# Patient Record
Sex: FEMALE | Race: WHITE | NOT HISPANIC OR LATINO | Employment: OTHER | URBAN - METROPOLITAN AREA
[De-identification: names, ages, dates, MRNs, and addresses within clinical notes are randomized per-mention and may not be internally consistent; named-entity substitution may affect disease eponyms.]

---

## 2021-09-08 ENCOUNTER — THERAPY VISIT (OUTPATIENT)
Dept: PHYSICAL THERAPY | Facility: CLINIC | Age: 66
End: 2021-09-08
Payer: COMMERCIAL

## 2021-09-08 DIAGNOSIS — M25.552 HIP PAIN, LEFT: ICD-10-CM

## 2021-09-08 PROCEDURE — 97110 THERAPEUTIC EXERCISES: CPT | Mod: GP | Performed by: PHYSICAL THERAPIST

## 2021-09-08 PROCEDURE — 97161 PT EVAL LOW COMPLEX 20 MIN: CPT | Mod: GP | Performed by: PHYSICAL THERAPIST

## 2021-09-08 ASSESSMENT — ACTIVITIES OF DAILY LIVING (ADL)
GOING_UP_1_FLIGHT_OF_STAIRS: SLIGHT DIFFICULTY
LIGHT_TO_MODERATE_WORK: NO DIFFICULTY AT ALL
WALK: ACTIVITY IS MINIMALLY DIFFICULT
GO DOWN STAIRS: ACTIVITY IS MINIMALLY DIFFICULT
GETTING_INTO_AND_OUT_OF_A_BATHTUB: UNABLE TO DO
KNEE_ACTIVITY_OF_DAILY_LIVING_SUM: 52
WALKING_UP_STEEP_HILLS: SLIGHT DIFFICULTY
WALKING_APPROXIMATELY_10_MINUTES: SLIGHT DIFFICULTY
HOW_WOULD_YOU_RATE_THE_CURRENT_FUNCTION_OF_YOUR_KNEE_DURING_YOUR_USUAL_DAILY_ACTIVITIES_ON_A_SCALE_FROM_0_TO_100_WITH_100_BEING_YOUR_LEVEL_OF_KNEE_FUNCTION_PRIOR_TO_YOUR_INJURY_AND_0_BEING_THE_INABILITY_TO_PERFORM_ANY_OF_YOUR_USUAL_DAILY_ACTIVITIES?: 75
WALKING_DOWN_STEEP_HILLS: SLIGHT DIFFICULTY
AS_A_RESULT_OF_YOUR_KNEE_INJURY,_HOW_WOULD_YOU_RATE_YOUR_CURRENT_LEVEL_OF_DAILY_ACTIVITY?: ABNORMAL
WALKING_INITIALLY: NO DIFFICULTY AT ALL
HOS_ADL_COUNT: 17
HOS_ADL_HIGHEST_POTENTIAL_SCORE: 68
PAIN: THE SYMPTOM AFFECTS MY ACTIVITY SLIGHTLY
DEEP_SQUATTING: MODERATE DIFFICULTY
SQUAT: ACTIVITY IS SOMEWHAT DIFFICULT
STEPPING_UP_AND_DOWN_CURBS: SLIGHT DIFFICULTY
HOW_WOULD_YOU_RATE_THE_OVERALL_FUNCTION_OF_YOUR_KNEE_DURING_YOUR_USUAL_DAILY_ACTIVITIES?: ABNORMAL
HOW_WOULD_YOU_RATE_YOUR_CURRENT_LEVEL_OF_FUNCTION_DURING_YOUR_USUAL_ACTIVITIES_OF_DAILY_LIVING_FROM_0_TO_100_WITH_100_BEING_YOUR_LEVEL_OF_FUNCTION_PRIOR_TO_YOUR_HIP_PROBLEM_AND_0_BEING_THE_INABILITY_TO_PERFORM_ANY_OF_YOUR_USUAL_DAILY_ACTIVITIES?: 75
SITTING_FOR_15_MINUTES: NO DIFFICULTY AT ALL
SWELLING: I DO NOT HAVE THE SYMPTOM
RECREATIONAL_ACTIVITIES: SLIGHT DIFFICULTY
GOING_DOWN_1_FLIGHT_OF_STAIRS: SLIGHT DIFFICULTY
STIFFNESS: THE SYMPTOM AFFECTS MY ACTIVITY SLIGHTLY
STAND: ACTIVITY IS NOT DIFFICULT
ROLLING_OVER_IN_BED: SLIGHT DIFFICULTY
RAW_SCORE: 52
GO UP STAIRS: ACTIVITY IS MINIMALLY DIFFICULT
GIVING WAY, BUCKLING OR SHIFTING OF KNEE: I DO NOT HAVE THE SYMPTOM
TWISTING/PIVOTING_ON_INVOLVED_LEG: SLIGHT DIFFICULTY
HEAVY_WORK: SLIGHT DIFFICULTY
SIT WITH YOUR KNEE BENT: ACTIVITY IS NOT DIFFICULT
PUTTING_ON_SOCKS_AND_SHOES: SLIGHT DIFFICULTY
GETTING_INTO_AND_OUT_OF_AN_AVERAGE_CAR: SLIGHT DIFFICULTY
WEAKNESS: THE SYMPTOM AFFECTS MY ACTIVITY SLIGHTLY
RISE FROM A CHAIR: ACTIVITY IS SOMEWHAT DIFFICULT
HOS_ADL_ITEM_SCORE_TOTAL: 50
LIMPING: I HAVE THE SYMPTOM BUT IT DOES NOT AFFECT MY ACTIVITY
HOS_ADL_SCORE(%): 73.53
KNEEL ON THE FRONT OF YOUR KNEE: ACTIVITY IS VERY DIFFICULT
KNEE_ACTIVITY_OF_DAILY_LIVING_SCORE: 74.29
STANDING_FOR_15_MINUTES: NO DIFFICULTY AT ALL
WALKING_15_MINUTES_OR_GREATER: SLIGHT DIFFICULTY

## 2021-09-08 NOTE — PROGRESS NOTES
Physical Therapy Initial Evaluation  Subjective:  The history is provided by the patient. No  was used.   Patient Health History  Ann Malave being seen for L hip and leg pain.     Problem began: 7/8/2021.   Problem occurred: unknown   Pain is reported as 5/10 on pain scale.  General health as reported by patient is good.  Pertinent medical history includes: menopausal, osteoarthritis and overweight.     Medical allergies: other. Other medical allergies details: sulpha.   Surgeries include:  Orthopedic surgery. Other surgery history details: B TKA.    Current medications:  None.    Current occupation is retired RN.   Primary job tasks include:  Computer work, driving, lifting/carrying, pushing/pulling and repetitive tasks.                  Therapist Generated HPI Evaluation  Problem details: Onset of L hip to lateral calf pain 2 months ago for unknown reasons. Notes her pain with sitting greater than 20 minutes, walking > 15', standing > 45 minutes gets lateral calf pain, rising sit to stand gets B hip to knee pain. .         Type of problem:  Left hip.    This is a new condition.  Condition occurred with:  Insidious onset.  Where condition occurred: for unknown reasons.  Patient reports pain:  Lateral.  Pain is described as aching and is intermittent.  Pain radiates to:  Thigh, knee and lower leg. Pain is worse in the A.M..  Since onset symptoms are unchanged.  Symptoms are exacerbated by standing, transfers, bending/squatting, sitting and walking  and relieved by rest.      Barriers include:  None as reported by patient.                        Objective:  System         Lumbar/SI Evaluation  ROM:    AROM Lumbar:   Flexion:          Hands to mid lower leg  Ext:                    75%   Side Bend:        Left:     Right:   Rotation:           Left:     Right:   Side Glide:        Left:  100% (LBP)    Right:  100% (LBP)                                                              Hip  Evaluation  Hip PROM:  Hip PROM:  Left Hip:    Normal  Right Hip:  Normal                          Hip Strength:    Flexion:   Left: 5/5   Pain:  Right: 5/5   Pain:                      Abduction:  Left: 4/5     Pain:Right: 4+/5    Pain:  Adduction:  Left: 5/5    Pain:Right: 5/5   Pain:  Internal Rotation:  Left: 5-/5   +   Pain:Right: 5/5   Pain:  External Rotation:  Left: 5/5   Pain:  Right: 5/5   +  Pain:              Hip Palpation:    Left hip tenderness present at:   Greater Trachanter and IT Band    Functional Testing:          Quad:      Bilateral leg squat:  Mild loss of control and excessive anterior knee excursion                  General     ROS    Assessment/Plan:    Patient is a 66 year old female with left side hip complaints.  She has TTP to L greater trochanter and ITB and weakness in B glute medius. Will monitor lumbar involvement d/t L calf sx's.  Patient has the following significant findings with corresponding treatment plan.                Diagnosis 1:  L hip to calf pain  Pain -  manual therapy, self management, education, directional preference exercise and home program  Decreased ROM/flexibility - manual therapy and therapeutic exercise  Decreased strength - therapeutic exercise and therapeutic activities  Decreased function - therapeutic activities  Impaired posture - neuro re-education    Therapy Evaluation Codes:   Cumulative Therapy Evaluation is: Low complexity.    Previous and current functional limitations:  (See Goal Flow Sheet for this information)    Short term and Long term goals: (See Goal Flow Sheet for this information)     Communication ability:  Patient appears to be able to clearly communicate and understand verbal and written communication and follow directions correctly.  Treatment Explanation - The following has been discussed with the patient:   RX ordered/plan of care  Anticipated outcomes  Possible risks and side effects  This patient would benefit from PT intervention  to resume normal activities.   Rehab potential is good.    Frequency:  1 X week, once daily  Duration:  for 6 weeks  Discharge Plan:  Achieve all LTG.  Independent in home treatment program.  Reach maximal therapeutic benefit.    Please refer to the daily flowsheet for treatment today, total treatment time and time spent performing 1:1 timed codes.

## 2021-09-20 ENCOUNTER — THERAPY VISIT (OUTPATIENT)
Dept: PHYSICAL THERAPY | Facility: CLINIC | Age: 66
End: 2021-09-20
Payer: COMMERCIAL

## 2021-09-20 DIAGNOSIS — M25.552 HIP PAIN, LEFT: ICD-10-CM

## 2021-09-20 PROCEDURE — 97110 THERAPEUTIC EXERCISES: CPT | Mod: GP | Performed by: PHYSICAL THERAPIST

## 2021-09-20 PROCEDURE — 97530 THERAPEUTIC ACTIVITIES: CPT | Mod: GP | Performed by: PHYSICAL THERAPIST

## 2021-09-29 ENCOUNTER — THERAPY VISIT (OUTPATIENT)
Dept: PHYSICAL THERAPY | Facility: CLINIC | Age: 66
End: 2021-09-29
Payer: COMMERCIAL

## 2021-09-29 DIAGNOSIS — M25.552 HIP PAIN, LEFT: ICD-10-CM

## 2021-09-29 PROCEDURE — 97530 THERAPEUTIC ACTIVITIES: CPT | Mod: GP | Performed by: PHYSICAL THERAPIST

## 2021-09-29 PROCEDURE — 97112 NEUROMUSCULAR REEDUCATION: CPT | Mod: GP | Performed by: PHYSICAL THERAPIST

## 2021-09-29 PROCEDURE — 97110 THERAPEUTIC EXERCISES: CPT | Mod: GP | Performed by: PHYSICAL THERAPIST

## 2021-10-18 ENCOUNTER — THERAPY VISIT (OUTPATIENT)
Dept: PHYSICAL THERAPY | Facility: CLINIC | Age: 66
End: 2021-10-18
Payer: COMMERCIAL

## 2021-10-18 DIAGNOSIS — M25.552 HIP PAIN, LEFT: ICD-10-CM

## 2021-10-18 PROCEDURE — 97110 THERAPEUTIC EXERCISES: CPT | Mod: GP | Performed by: PHYSICAL THERAPIST

## 2021-10-18 PROCEDURE — 97112 NEUROMUSCULAR REEDUCATION: CPT | Mod: GP | Performed by: PHYSICAL THERAPIST

## 2021-10-18 PROCEDURE — 97530 THERAPEUTIC ACTIVITIES: CPT | Mod: GP | Performed by: PHYSICAL THERAPIST

## 2021-10-18 ASSESSMENT — ACTIVITIES OF DAILY LIVING (ADL)
HOW_WOULD_YOU_RATE_YOUR_CURRENT_LEVEL_OF_FUNCTION_DURING_YOUR_USUAL_ACTIVITIES_OF_DAILY_LIVING_FROM_0_TO_100_WITH_100_BEING_YOUR_LEVEL_OF_FUNCTION_PRIOR_TO_YOUR_HIP_PROBLEM_AND_0_BEING_THE_INABILITY_TO_PERFORM_ANY_OF_YOUR_USUAL_DAILY_ACTIVITIES?: 70
TWISTING/PIVOTING_ON_INVOLVED_LEG: NO DIFFICULTY AT ALL
GOING_DOWN_1_FLIGHT_OF_STAIRS: SLIGHT DIFFICULTY
LIGHT_TO_MODERATE_WORK: NO DIFFICULTY AT ALL
HOS_ADL_ITEM_SCORE_TOTAL: 58
SITTING_FOR_15_MINUTES: NO DIFFICULTY AT ALL
WALKING_DOWN_STEEP_HILLS: SLIGHT DIFFICULTY
HEAVY_WORK: SLIGHT DIFFICULTY
GETTING_INTO_AND_OUT_OF_AN_AVERAGE_CAR: SLIGHT DIFFICULTY
HOS_ADL_COUNT: 17
GETTING_INTO_AND_OUT_OF_A_BATHTUB: MODERATE DIFFICULTY
ROLLING_OVER_IN_BED: NO DIFFICULTY AT ALL
WALKING_APPROXIMATELY_10_MINUTES: NO DIFFICULTY AT ALL
HOS_ADL_SCORE(%): 85.29
PUTTING_ON_SOCKS_AND_SHOES: SLIGHT DIFFICULTY
WALKING_15_MINUTES_OR_GREATER: NO DIFFICULTY AT ALL
GOING_UP_1_FLIGHT_OF_STAIRS: SLIGHT DIFFICULTY
WALKING_UP_STEEP_HILLS: SLIGHT DIFFICULTY
HOS_ADL_HIGHEST_POTENTIAL_SCORE: 68
WALKING_INITIALLY: NO DIFFICULTY AT ALL
DEEP_SQUATTING: SLIGHT DIFFICULTY
STEPPING_UP_AND_DOWN_CURBS: NO DIFFICULTY AT ALL
RECREATIONAL_ACTIVITIES: SLIGHT DIFFICULTY
STANDING_FOR_15_MINUTES: NO DIFFICULTY AT ALL

## 2021-10-18 NOTE — PROGRESS NOTES
Subjective:  HPI  Physical Exam                    Objective:  System    Physical Exam    General     ROS    Assessment/Plan:    DISCHARGE REPORT    Progress reporting period is from 9-8-21 to 10-18-21.       SUBJECTIVE  Subjective changes noted by patient: Better. Walking, standing, sitting are all improved. Notes occasional L knee ache. Feels ready to continue on her own with HEP.     Current Pain level: 0/10.      Initial Pain level: 5/10.   Changes in function:  Yes (See Goal flowsheet attached for changes in current functional level)  Adverse reaction to treatment or activity: None    OBJECTIVE  Changes noted in objective findings:  Yes,   Objective: LAROM: flex improved from hands to mid lower leg to ankles with no pain; ext improved from 75% to 100%. No pain with step up. Decreased TTP to L lateral greater trochanter.     ASSESSMENT/PLAN  Updated problem list and treatment plan: Diagnosis 1:  L hip pain  STG/LTGs have been met or progress has been made towards goals:  Yes (See Goal flow sheet completed today.)  Assessment of Progress: The patient's condition is improving.  The patient has met all of their long term goals.  Self Management Plans:  Patient has been instructed in a home treatment program.  Patient  has been instructed in self management of symptoms.  I have re-evaluated this patient and find that the nature, scope, duration and intensity of the therapy is appropriate for the medical condition of the patient.  Ann continues to require the following intervention to meet STG and LTG's:  PT intervention is no longer required to meet STG/LTG.    Recommendations:  This patient is ready to be discharged from therapy and continue their home treatment program.    Please refer to the daily flowsheet for treatment today, total treatment time and time spent performing 1:1 timed codes.

## 2021-10-18 NOTE — LETTER
[facility]  [address]  [facility] [address]  [phone]    2021    Re: Ann Malave   :   1955  MRN:  [id number]   REFERRING PHYSICIAN:   Snehal DONALD [facility]    Date of Initial Evaluation:  2021  Visits:  Rxs Used: 4  Reason for Referral:  Hip pain, left    DISCHARGE REPORT  Progress reporting period is from 21 to 10-18-21.       SUBJECTIVE  Subjective changes noted by patient: Better. Walking, standing, sitting are all improved. Notes occasional L knee ache. Feels ready to continue on her own with HEP.     Current Pain level: 0/10.      Initial Pain level: 5/10.   Changes in function:  Yes (See Goal flowsheet attached for changes in current functional level)  Adverse reaction to treatment or activity: None    OBJECTIVE  Changes noted in objective findings:  Yes,   Objective: LAROM: flex improved from hands to mid lower leg to ankles with no pain; ext improved from 75% to 100%. No pain with step up. Decreased TTP to L lateral greater trochanter.     ASSESSMENT/PLAN  Updated problem list and treatment plan: Diagnosis 1:  L hip pain  STG/LTGs have been met or progress has been made towards goals:  Yes (See Goal flow sheet completed today.)  Assessment of Progress: The patient's condition is improving.  The patient has met all of their long term goals.  Self Management Plans:  Patient has been instructed in a home treatment program.  Patient  has been instructed in self management of symptoms.  I have re-evaluated this patient and find that the nature, scope, duration and intensity of the therapy is appropriate for the medical condition of the patient.  Ann continues to require the following intervention to meet STG and LTG's:  PT intervention is no longer required to meet STG/LTG.    Recommendations:  This patient is ready to be discharged from therapy and continue their  home treatment program.        Thank you for your referral.    INQUIRIES  Therapist: Sylvia Batista PT  [facility]  [address] [address]  Phone: [phone]  Fax: [phone]

## 2021-11-13 ENCOUNTER — HEALTH MAINTENANCE LETTER (OUTPATIENT)
Age: 66
End: 2021-11-13

## 2022-05-08 ENCOUNTER — HOSPITAL ENCOUNTER (EMERGENCY)
Facility: CLINIC | Age: 67
Discharge: LEFT BEFORE DISCHARGE | End: 2022-05-08
Admitting: EMERGENCY MEDICINE
Payer: COMMERCIAL

## 2022-05-08 VITALS
TEMPERATURE: 99.1 F | RESPIRATION RATE: 16 BRPM | WEIGHT: 260 LBS | SYSTOLIC BLOOD PRESSURE: 167 MMHG | DIASTOLIC BLOOD PRESSURE: 90 MMHG | BODY MASS INDEX: 44.39 KG/M2 | HEART RATE: 76 BPM | OXYGEN SATURATION: 99 % | HEIGHT: 64 IN

## 2022-05-08 PROCEDURE — 999N000104 HC STATISTIC NO CHARGE

## 2022-05-08 PROCEDURE — 250N000013 HC RX MED GY IP 250 OP 250 PS 637: Performed by: EMERGENCY MEDICINE

## 2022-05-08 RX ORDER — ACETAMINOPHEN 325 MG/1
650 TABLET ORAL ONCE
Status: COMPLETED | OUTPATIENT
Start: 2022-05-08 | End: 2022-05-08

## 2022-05-08 RX ORDER — IBUPROFEN 600 MG/1
600 TABLET, FILM COATED ORAL ONCE
Status: COMPLETED | OUTPATIENT
Start: 2022-05-08 | End: 2022-05-08

## 2022-05-08 RX ADMIN — ACETAMINOPHEN 650 MG: 325 TABLET ORAL at 20:40

## 2022-05-08 RX ADMIN — IBUPROFEN 600 MG: 600 TABLET ORAL at 20:40

## 2022-05-09 NOTE — ED TRIAGE NOTES
Patient has chronic low back pain which she typically manages with chiropractic care and NSAIDS, sat on a toilet today which she said was probably too low, which caused severe low back pain and left hip pain. She has had 600 mg Ibuprofen x 2 today, last dose was around 1400     Triage Assessment     Row Name 05/08/22 2034       Triage Assessment (Adult)    Airway WDL WDL       Respiratory WDL    Respiratory WDL WDL       Skin Circulation/Temperature WDL    Skin Circulation/Temperature WDL WDL       Cardiac WDL    Cardiac WDL WDL       Peripheral/Neurovascular WDL    Peripheral Neurovascular WDL WDL       Cognitive/Neuro/Behavioral WDL    Cognitive/Neuro/Behavioral WDL WDL

## 2022-11-20 ENCOUNTER — HEALTH MAINTENANCE LETTER (OUTPATIENT)
Age: 67
End: 2022-11-20

## 2023-11-25 ENCOUNTER — HEALTH MAINTENANCE LETTER (OUTPATIENT)
Age: 68
End: 2023-11-25